# Patient Record
Sex: FEMALE | Race: WHITE | Employment: OTHER | URBAN - METROPOLITAN AREA
[De-identification: names, ages, dates, MRNs, and addresses within clinical notes are randomized per-mention and may not be internally consistent; named-entity substitution may affect disease eponyms.]

---

## 2022-07-20 RX ORDER — ATORVASTATIN CALCIUM 10 MG/1
10 TABLET, FILM COATED ORAL DAILY
COMMUNITY

## 2022-07-20 RX ORDER — PANTOPRAZOLE SODIUM 40 MG/1
40 GRANULE, DELAYED RELEASE ORAL
COMMUNITY

## 2022-07-20 RX ORDER — NITROFURANTOIN 25; 75 MG/1; MG/1
100 CAPSULE ORAL EVERY 12 HOURS
COMMUNITY

## 2022-07-20 NOTE — PROGRESS NOTES
C-diff Questionnaire:     * Admitted with diarrhea? [x] YES    []  NO     *Prior history of C-Diff. In last 3 months? [x] YES    []  NO     *Antibiotic use in the past 6-8 weeks? []  NO    [x]  YES      If yes, which: REASON___UTI______________     *Prior hospitalization or nursing home in the last month? []  YES    [x]  NO     [facility]. .call before you fall    [address] time__0830__________        Surgery time_____1000_______    Do not eat or drink anything after 12:00 midnight prior to your surgery. This includes water chewing gum, mints and ice chips- the Day of Surgery. Follow your MD/Surgeons pre-procedure instructions regarding your medications   You may brush your teeth and gargle the morning of your surgery, but do not swallow the water     Please see your family doctor/pediatrician for a history and physical and/or questions concerning medications. Bring any test results/reports from your physicians office. If you are under the care of a heart doctor or specialist doctor, please be aware that you may be asked to them for clearance    You may be asked to stop blood thinners such as Coumadin, Plavix, Fragmin, Lovenox, etc., or any anti-inflammatories such as:  Aspirin, Ibuprofen, Advil, Naproxen prior to your surgery. We also ask that you stop any OTC medications such as fish oil, vitamin E, glucosamine, garlic, Multivitamins, COQ 10, etc.  MAY TAKE TYLENOL  We ask that you do not smoke 24 hours prior to surgery  We ask that you do not  drink any alcoholic beverages 24 hours prior to surgery     You must make arrangements for a responsible adult to take you home after your surgery. For your safety you will not be allowed to leave alone or drive yourself home. Your surgery will be cancelled if you do not have a ride home.      Also for your safety, it is strongly suggested that someone stay with you the first 24 hours after your surgery. A parent or legal guardian must accompany a child scheduled for surgery and plan to stay at the hospital until the child is discharged. Please do not bring other children with you. For your comfort, please wear simple loose fitting clothing to the hospital.  Please do not bring valuables. Do not wear any make-up or nail polish on your fingers or toes. For your safety, please do not wear any jewelry or body piercing's on the day of surgery. All jewelry must be removed. If you have dentures, they will be removed before going to operating room. For your convenience, we will provide you with a container. If you wear contact lenses or glasses, they will be removed, please bring a case for them. If you have a living will and a durable power of  for healthcare, please bring in a copy. As part of our patient safety program to minimize surgical site infections, we ask you to do the following:    Please notify your surgeon if you develop any illness between         now and the day of your surgery. This includes a cough, cold, fever, sore throat, nausea,         or vomiting, and diarrhea, etc.   Please notify your surgeon if you experience dizziness, shortness         of breath or blurred vision between now and the time of your surgery. Do not shave your operative site 96 hours prior to surgery. For face and neck surgery, men may use an electric razor 48 hours   prior to surgery. You may shower the night before surgery or the morning of   your surgery with an antibacterial soap. You will need to bring a photo ID and insurance card     If you use a C-pap or Bi-pap machine, please bring your machine with you to the hospital     Our goal is to provide you with excellent care, therefore, visitors will be limited to so that we may focus on providing this care for you. Please contact your surgeon office, if you have any further questions. [facility] phone number:  [phone] PAT fax number:  [phone]    Please note these are generalized instructions for all surgical cases, you may be provided with more specific instructions according to your surgery.

## 2022-07-28 ENCOUNTER — ANESTHESIA EVENT (OUTPATIENT)
Dept: ENDOSCOPY | Age: 77
End: 2022-07-28
Payer: MEDICARE

## 2022-07-29 ENCOUNTER — HOSPITAL ENCOUNTER (OUTPATIENT)
Age: 77
Setting detail: OUTPATIENT SURGERY
Discharge: HOME | End: 2022-07-29
Attending: INTERNAL MEDICINE | Admitting: INTERNAL MEDICINE
Payer: MEDICARE

## 2022-07-29 ENCOUNTER — ANESTHESIA (OUTPATIENT)
Dept: ENDOSCOPY | Age: 77
End: 2022-07-29
Payer: MEDICARE

## 2022-07-29 VITALS
TEMPERATURE: 97.6 F | HEART RATE: 79 BPM | WEIGHT: 144.29 LBS | RESPIRATION RATE: 18 BRPM | DIASTOLIC BLOOD PRESSURE: 69 MMHG | SYSTOLIC BLOOD PRESSURE: 132 MMHG | OXYGEN SATURATION: 97 % | BODY MASS INDEX: 27.24 KG/M2 | HEIGHT: 61 IN

## 2022-07-29 DIAGNOSIS — Z86.010 PERSONAL HISTORY OF COLONIC POLYPS: ICD-10-CM

## 2022-07-29 PROCEDURE — 2580000003 HC RX 258: Performed by: ANESTHESIOLOGY

## 2022-07-29 PROCEDURE — 6360000002 HC RX W HCPCS: Performed by: NURSE ANESTHETIST, CERTIFIED REGISTERED

## 2022-07-29 PROCEDURE — 2580000003 HC RX 258: Performed by: NURSE ANESTHETIST, CERTIFIED REGISTERED

## 2022-07-29 PROCEDURE — 7100000001 HC PACU RECOVERY - ADDTL 15 MIN: Performed by: INTERNAL MEDICINE

## 2022-07-29 PROCEDURE — 7100000011 HC PHASE II RECOVERY - ADDTL 15 MIN: Performed by: INTERNAL MEDICINE

## 2022-07-29 PROCEDURE — 3700000001 HC ADD 15 MINUTES (ANESTHESIA): Performed by: INTERNAL MEDICINE

## 2022-07-29 PROCEDURE — 3609010600 HC COLONOSCOPY POLYPECTOMY SNARE/COLD BIOPSY: Performed by: INTERNAL MEDICINE

## 2022-07-29 PROCEDURE — 7100000000 HC PACU RECOVERY - FIRST 15 MIN: Performed by: INTERNAL MEDICINE

## 2022-07-29 PROCEDURE — 88305 TISSUE EXAM BY PATHOLOGIST: CPT

## 2022-07-29 PROCEDURE — 2709999900 HC NON-CHARGEABLE SUPPLY: Performed by: INTERNAL MEDICINE

## 2022-07-29 PROCEDURE — 3700000000 HC ANESTHESIA ATTENDED CARE: Performed by: INTERNAL MEDICINE

## 2022-07-29 PROCEDURE — 7100000010 HC PHASE II RECOVERY - FIRST 15 MIN: Performed by: INTERNAL MEDICINE

## 2022-07-29 RX ORDER — SODIUM CHLORIDE 0.9 % (FLUSH) 0.9 %
5-40 SYRINGE (ML) INJECTION PRN
Status: DISCONTINUED | OUTPATIENT
Start: 2022-07-29 | End: 2022-07-29 | Stop reason: HOSPADM

## 2022-07-29 RX ORDER — ACETAMINOPHEN 325 MG/1
975 TABLET ORAL
Status: DISCONTINUED | OUTPATIENT
Start: 2022-07-29 | End: 2022-07-29 | Stop reason: HOSPADM

## 2022-07-29 RX ORDER — FENTANYL CITRATE 50 UG/ML
25 INJECTION, SOLUTION INTRAMUSCULAR; INTRAVENOUS EVERY 5 MIN PRN
Status: DISCONTINUED | OUTPATIENT
Start: 2022-07-29 | End: 2022-07-29 | Stop reason: HOSPADM

## 2022-07-29 RX ORDER — PROPOFOL 10 MG/ML
INJECTION, EMULSION INTRAVENOUS PRN
Status: DISCONTINUED | OUTPATIENT
Start: 2022-07-29 | End: 2022-07-29 | Stop reason: SDUPTHER

## 2022-07-29 RX ORDER — SODIUM CHLORIDE 9 MG/ML
INJECTION, SOLUTION INTRAVENOUS PRN
Status: DISCONTINUED | OUTPATIENT
Start: 2022-07-29 | End: 2022-07-29 | Stop reason: HOSPADM

## 2022-07-29 RX ORDER — HYDRALAZINE HYDROCHLORIDE 20 MG/ML
10 INJECTION INTRAMUSCULAR; INTRAVENOUS
Status: DISCONTINUED | OUTPATIENT
Start: 2022-07-29 | End: 2022-07-29 | Stop reason: HOSPADM

## 2022-07-29 RX ORDER — PROPOFOL 10 MG/ML
INJECTION, EMULSION INTRAVENOUS CONTINUOUS PRN
Status: DISCONTINUED | OUTPATIENT
Start: 2022-07-29 | End: 2022-07-29 | Stop reason: SDUPTHER

## 2022-07-29 RX ORDER — SODIUM CHLORIDE 0.9 % (FLUSH) 0.9 %
5-40 SYRINGE (ML) INJECTION EVERY 12 HOURS SCHEDULED
Status: DISCONTINUED | OUTPATIENT
Start: 2022-07-29 | End: 2022-07-29 | Stop reason: HOSPADM

## 2022-07-29 RX ORDER — ONDANSETRON 2 MG/ML
4 INJECTION INTRAMUSCULAR; INTRAVENOUS
Status: DISCONTINUED | OUTPATIENT
Start: 2022-07-29 | End: 2022-07-29 | Stop reason: HOSPADM

## 2022-07-29 RX ORDER — SODIUM CHLORIDE 9 MG/ML
INJECTION, SOLUTION INTRAVENOUS CONTINUOUS
Status: DISCONTINUED | OUTPATIENT
Start: 2022-07-29 | End: 2022-07-29 | Stop reason: HOSPADM

## 2022-07-29 RX ORDER — IPRATROPIUM BROMIDE AND ALBUTEROL SULFATE 2.5; .5 MG/3ML; MG/3ML
1 SOLUTION RESPIRATORY (INHALATION)
Status: DISCONTINUED | OUTPATIENT
Start: 2022-07-29 | End: 2022-07-29 | Stop reason: HOSPADM

## 2022-07-29 RX ORDER — LABETALOL HYDROCHLORIDE 5 MG/ML
10 INJECTION, SOLUTION INTRAVENOUS
Status: DISCONTINUED | OUTPATIENT
Start: 2022-07-29 | End: 2022-07-29 | Stop reason: HOSPADM

## 2022-07-29 RX ORDER — SODIUM CHLORIDE 9 MG/ML
INJECTION, SOLUTION INTRAVENOUS CONTINUOUS PRN
Status: DISCONTINUED | OUTPATIENT
Start: 2022-07-29 | End: 2022-07-29 | Stop reason: SDUPTHER

## 2022-07-29 RX ORDER — FENTANYL CITRATE 50 UG/ML
50 INJECTION, SOLUTION INTRAMUSCULAR; INTRAVENOUS EVERY 5 MIN PRN
Status: DISCONTINUED | OUTPATIENT
Start: 2022-07-29 | End: 2022-07-29 | Stop reason: HOSPADM

## 2022-07-29 RX ADMIN — PROPOFOL 70 MG: 10 INJECTION, EMULSION INTRAVENOUS at 09:53

## 2022-07-29 RX ADMIN — PROPOFOL 150 MCG/KG/MIN: 10 INJECTION, EMULSION INTRAVENOUS at 09:53

## 2022-07-29 RX ADMIN — SODIUM CHLORIDE: 9 INJECTION, SOLUTION INTRAVENOUS at 09:06

## 2022-07-29 RX ADMIN — SODIUM CHLORIDE: 9 INJECTION, SOLUTION INTRAVENOUS at 09:26

## 2022-07-29 ASSESSMENT — PAIN SCALES - GENERAL
PAINLEVEL_OUTOF10: 0

## 2022-07-29 ASSESSMENT — LIFESTYLE VARIABLES: SMOKING_STATUS: 0

## 2022-07-29 NOTE — ANESTHESIA POSTPROCEDURE EVALUATION
Department of Anesthesiology  Postprocedure Note    Patient: Mainor Miller  MRN: [id number]  YOB: 1945  Date of evaluation: 7/29/2022      Procedure Summary     Date: 07/29/22 Room / Location: [address] [place] [address]    Anesthesia Start: 3785 Anesthesia Stop: 9242    Procedure: COLONOSCOPY POLYPECTOMY SNARE/COLD BIOPSY Diagnosis:       Personal history of colonic polyps      (PERSONAL HISTORY OF COLONIC POLYPS)    Surgeons: Britney Vera MD Responsible Provider: Julissa Calixto MD    Anesthesia Type: MAC ASA Status: 2          Anesthesia Type: MAC    Cynthia Phase I: Cynthia Score: 10    Cynthia Phase II: Cynthia Score: 10      Anesthesia Post Evaluation    Patient location during evaluation: PACU  Patient participation: complete - patient participated  Level of consciousness: awake and alert  Airway patency: patent  Nausea & Vomiting: no nausea and no vomiting  Complications: no  Cardiovascular status: hemodynamically stable and blood pressure returned to baseline  Respiratory status: spontaneous ventilation, nonlabored ventilation and room air  Hydration status: stable  Comments: Ms. Darline Garcia was seen resting comfortably following procedure. Anticipate return [facility] for planned discharge home with .

## 2022-07-29 NOTE — OP NOTE
Colonoscopy Note    Patient:   Chloe Ramesh    :    1945    Facility:   [facility] [Outpatient]  Referring/PCP: Tiera Solis MD  Procedure:   Colonoscopy   Date:     2022  Endoscopist:  Sandi Zhang MD, MD    Preoperative Diagnosis:  family history of colon cancer. Postoperative Diagnosis: 2 ascending colon polyps removed with a cold snare. Severe diverticular disease in the sigmoid colon. Anesthesia: MAC    Estimated blood loss: Minimal    Complications:  None    Description of Procedure:  Informed consent was obtained from the patient after explanation of the procedure including indications, description of the procedure,  benefits and possible risks and complications of the procedure, and alternatives. Questions were answered. The patient's history was reviewed and a directed physical examination was performed prior to the procedure. Patient was monitored throughout the procedure with pulse oximetry and periodic assessment of vital signs. Patient was sedated as noted above. With the patient initially in the left lateral decubitus position, a digital rectal examination was performed and revealed negative without mass, lesions or tenderness. The Olympus video colonoscope was placed in the patient's rectum and advanced without difficulty  to the cecum, which was identified by the ileocecal valve and appendiceal orifice. The prep was excellent. Examination of the mucosa was performed during both introduction and withdrawal of the colonoscope. Retroflexed view of the rectum was performed. Findings: The mucosa throughout the colon is normal.  There was no evidence of colitis. 2 diminutive ascending colon polyps were removed with a cold snare. Specimens were recovered and sent for pathology. The patient has severe diverticular disease in the sigmoid colon. Small internal hemorrhoids were noted on retroflexed view.       Recommendations: Call in 1 week for pathology findings. High-fiber diet. Follow-up as needed.     Nat Brittle, MD, MD

## 2022-07-29 NOTE — ANESTHESIA PRE PROCEDURE
Department of Anesthesiology  Preprocedure Note       Name:  Isis Downing   Age:  68 y.o.  :  1945                                          MRN:  [id number]         Date:  2022      Surgeon: Linette Rome):  Isaac Bojorquez MD    Procedure: Procedure(s):  COLONOSCOPY    Medications prior to admission:   Prior to Admission medications    Medication Sig Start Date End Date Taking? Authorizing Provider   nitrofurantoin, macrocrystal-monohydrate, (MACROBID) 100 MG capsule Take 100 mg by mouth in the morning and 100 mg in the evening. Yes Historical Provider, MD   pantoprazole sodium (PROTONIX) 40 MG PACK packet Take 40 mg by mouth every morning (before breakfast)   Yes Historical Provider, MD   atorvastatin (LIPITOR) 10 MG tablet Take 10 mg by mouth in the morning. TAKES 1/2 TAB. Yes Historical Provider, MD   NONFORMULARY Take 2 tablets by mouth daily PARDARCO-SUPPLEMENT   Yes Historical Provider, MD       Current medications:    Current Facility-Administered Medications   Medication Dose Route Frequency Provider Last Rate Last Admin    0.9 % sodium chloride infusion   IntraVENous Continuous Kenton Litten, MD        sodium chloride flush 0.9 % injection 5-40 mL  5-40 mL IntraVENous 2 times per day Kenton Litten, MD        sodium chloride flush 0.9 % injection 5-40 mL  5-40 mL IntraVENous PRN Kenton Litten, MD        0.9 % sodium chloride infusion   IntraVENous PRN Kenton Litten, MD           Allergies:     Allergies   Allergen Reactions    Clindamycin/Lincomycin Diarrhea    Pcn [Penicillins] Other (See Comments)     BLISTERS ON MOUTH       Problem List:    Patient Active Problem List   Diagnosis Code    Puncture wound of hand S61.439A       Past Medical History:        Diagnosis Date    Acid reflux     Arthritis     [facility] [address]     Hyperlipidemia     BORDERLINE    Vertigo        Past Surgical History:        Procedure Laterality Date    APPENDECTOMY      BREAST LUMPECTOMY Right     COLONOSCOPY      ENDOSCOPY, COLON, DIAGNOSTIC         Social History:    Social History     Tobacco Use    Smoking status: Former     Years: 3.00     Types: Cigarettes     Quit date:      Years since quittin.6    Smokeless tobacco: Never   Substance Use Topics    Alcohol use: Yes     Comment: SOCIALLY                                Counseling given: Not Answered      Vital Signs (Current):   Vitals:    22 1645 22 0845   Weight: 140 lb (63.5 kg) 144 lb 4.7 oz (65.5 kg)   Height: 5' 1\" (1.549 m) 5' 1\" (1.549 m)                                              BP Readings from Last 3 Encounters:   13 112/48       NPO Status: Time of last liquid consumption: 0300                        Time of last solid consumption: 1200                        Date of last liquid consumption: 22                        Date of last solid food consumption: 22    BMI:   Wt Readings from Last 3 Encounters:   22 144 lb 4.7 oz (65.5 kg)   13 128 lb (58.1 kg)     Body mass index is 27.26 kg/m². CBC: No results found for: WBC, RBC, HGB, HCT, MCV, RDW, PLT    CMP: No results found for: NA, K, CL, CO2, BUN, CREATININE, GFRAA, AGRATIO, LABGLOM, GLUCOSE, GLU, PROT, CALCIUM, BILITOT, ALKPHOS, AST, ALT    POC Tests: No results for input(s): POCGLU, POCNA, POCK, POCCL, POCBUN, POCHEMO, POCHCT in the last 72 hours.     Coags: No results found for: PROTIME, INR, APTT    HCG (If Applicable): No results found for: PREGTESTUR, PREGSERUM, HCG, HCGQUANT     ABGs: No results found for: PHART, PO2ART, TFG3ELO, QDR0HLF, BEART, J1ASIBGJ     Type & Screen (If Applicable):  No results found for: LABABO, LABRH    Drug/Infectious Status (If Applicable):  No results found for: HIV, HEPCAB    COVID-19 Screening (If Applicable): No results found for: COVID19        Anesthesia Evaluation   no history of anesthetic complications:   Airway: Mallampati: II  TM distance: >3 FB   Neck ROM: limited  Mouth opening: > = 3 FB   Dental: normal exam         Pulmonary:       (-) COPD, asthma, rhonchi, wheezes, rales and not a current smoker                           Cardiovascular:  Exercise tolerance: good (>4 METS),   (+) hyperlipidemia    (-) hypertension, orthopnea, weak pulses and peripheral edema      Rhythm: regular  Rate: normal                 ROS comment: MUGA (2012): Normal left ventricular wall motion and contractility with LVEF of 62.3%     Neuro/Psych:      (-) seizures, TIA and CVA           GI/Hepatic/Renal:   (+) GERD:,      (-) liver disease, no renal disease and no morbid obesity      ROS comment: h/o esophageal stricture s/p dilation. Endo/Other:    (+) : arthritis:., malignancy/cancer. (-) diabetes mellitus (HgbA1c: 5.3%)               Abdominal:         (-) obese Abdomen: soft. Vascular: Other Findings:           Anesthesia Plan      MAC     ASA 2     (NPO appropriate. Ms. Luis Flowers denies active nausea / reflux. Patient reports anal discomfort, suspects new onset of hemorrhoids with prep.)  Induction: intravenous. Anesthetic plan and risks discussed with patient. Plan discussed with CRNA. This pre-anesthesia assessment may be used as a history and physical.    DOS STAFF ADDENDUM:    Pt seen and examined, chart reviewed (including anesthesia, drug and allergy history). No interval changes to history and physical examination. Anesthetic plan, risks, benefits, alternatives, and personnel involved discussed with patient. Patient verbalized an understanding and agrees to proceed.       Americo Butts MD  July 29, 2022  8:59 AM

## 2022-07-29 NOTE — ADDENDUM NOTE
Addendum  created 07/29/22 1213 by Arash Chakraborty MD    Intraprocedure Event edited, Intraprocedure Staff edited

## 2022-07-29 NOTE — PROGRESS NOTES
Patient arrived to PACU at this time. Monitors applied. Bed locked and side rails up. Will continue to closely monitor patient.

## 2022-07-29 NOTE — PROGRESS NOTES
Pt sitting up in bed, tolerating PO snack. Pt denies pain states ready to leave. IV removed, discharge discussed with family.

## 2022-07-29 NOTE — H&P
Gastroenteroloy   Attending Pre-operative History and Physical    INDICATION: Screening colonoscopy, family history of colon cancer. PROCEDURE: Colonoscopy    History Obtained From:  patient    HISTORY OF PRESENT ILLNESS:    The patient is a 68 y.o. female presents for a screening colonoscopy. Past Medical History:    Past Medical History:   Diagnosis Date    Acid reflux     Arthritis     Cancer ([facility] [address]     Hyperlipidemia     BORDERLINE    Vertigo       Past Surgical History:    Past Surgical History:   Procedure Laterality Date    APPENDECTOMY      BREAST LUMPECTOMY Right     COLONOSCOPY      ENDOSCOPY, COLON, DIAGNOSTIC        Medications Prior to Admission:   Prior to Admission medications    Medication Sig Start Date End Date Taking? Authorizing Provider   nitrofurantoin, macrocrystal-monohydrate, (MACROBID) 100 MG capsule Take 100 mg by mouth in the morning and 100 mg in the evening. Yes Historical Provider, MD   pantoprazole sodium (PROTONIX) 40 MG PACK packet Take 40 mg by mouth every morning (before breakfast)   Yes Historical Provider, MD   atorvastatin (LIPITOR) 10 MG tablet Take 10 mg by mouth in the morning. TAKES 1/2 TAB. Yes Historical Provider, MD   NONFORMULARY Take 2 tablets by mouth daily PARDARCO-SUPPLEMENT   Yes Historical Provider, MD        Allergies:  Clindamycin/lincomycin and Pcn [penicillins]  History of allergic reaction to anesthesia:  No    Social History:   Social History     Socioeconomic History    Marital status:      Spouse name: Not on file    Number of children: Not on file    Years of education: Not on file    Highest education level: Not on file   Occupational History    Not on file   Tobacco Use    Smoking status: Former     Years: 3.00     Types: Cigarettes     Quit date: 65     Years since quittin.6    Smokeless tobacco: Never   Vaping Use    Vaping Use: Never used   Substance and Sexual Activity    Alcohol use: Yes     Comment: SOCIALLY    Drug use:  No

## 2022-07-29 NOTE — PROGRESS NOTES
Pt to phase 2 from pacu. Pt a/o x4, states no pain in abdo at this time, pt sitting in bed, given PO snack.  called to bedside.

## 2022-07-29 NOTE — DISCHARGE INSTRUCTIONS

## 2023-01-19 ENCOUNTER — HOSPITAL ENCOUNTER (OUTPATIENT)
Dept: WOMENS IMAGING | Age: 78
Discharge: HOME | End: 2023-01-19
Payer: MEDICARE

## 2023-01-19 DIAGNOSIS — Z12.31 VISIT FOR SCREENING MAMMOGRAM: ICD-10-CM

## 2023-01-19 PROCEDURE — 77063 BREAST TOMOSYNTHESIS BI: CPT

## 2023-07-05 ENCOUNTER — OFFICE VISIT (OUTPATIENT)
Dept: UROGYNECOLOGY | Age: 78
End: 2023-07-05
Payer: MEDICARE

## 2023-07-05 VITALS
SYSTOLIC BLOOD PRESSURE: 137 MMHG | RESPIRATION RATE: 18 BRPM | HEART RATE: 76 BPM | OXYGEN SATURATION: 97 % | DIASTOLIC BLOOD PRESSURE: 73 MMHG | TEMPERATURE: 98.2 F

## 2023-07-05 DIAGNOSIS — N81.11 CYSTOCELE, MIDLINE: Primary | ICD-10-CM

## 2023-07-05 DIAGNOSIS — N81.4 UTERINE PROLAPSE: ICD-10-CM

## 2023-07-05 DIAGNOSIS — R35.0 URINARY FREQUENCY: ICD-10-CM

## 2023-07-05 DIAGNOSIS — N95.2 VAGINAL ATROPHY: ICD-10-CM

## 2023-07-05 LAB
BILIRUBIN, POC: NORMAL
BLOOD URINE, POC: NORMAL
CLARITY, POC: CLEAR
COLOR, POC: YELLOW
EMPTY COUGH STRESS TEST: NORMAL
FIRST SENSATION: 60 CC
FULL COUGH STRESS TEST: NORMAL
GLUCOSE URINE, POC: NORMAL
KETONES, POC: NORMAL
LEUKOCYTE EST, POC: NORMAL
MAX SENSATION: 250 CC
NITRATE, URINE POC: NORMAL
NITRITE, POC: NORMAL
PH, POC: 6.5
POST VOID RESIDUAL (PVR): 125 ML
PROTEIN, POC: NORMAL
RBC URINE, POC: NORMAL
SECOND SENSATION: 130 CC
SPASM: NORMAL
SPECIFIC GRAVITY, POC: 1.01
UROBILINOGEN, POC: NORMAL
WBC URINE, POC: NORMAL

## 2023-07-05 PROCEDURE — 81002 URINALYSIS NONAUTO W/O SCOPE: CPT | Performed by: OBSTETRICS & GYNECOLOGY

## 2023-07-05 PROCEDURE — 51725 SIMPLE CYSTOMETROGRAM: CPT | Performed by: OBSTETRICS & GYNECOLOGY

## 2023-07-05 PROCEDURE — 99204 OFFICE O/P NEW MOD 45 MIN: CPT | Performed by: OBSTETRICS & GYNECOLOGY

## 2023-07-05 PROCEDURE — A4561 PESSARY RUBBER, ANY TYPE: HCPCS | Performed by: OBSTETRICS & GYNECOLOGY

## 2023-07-05 NOTE — PROGRESS NOTES
07/05/2023 neg  Final     SPASM   Date Value Ref Range Status   07/05/2023 neg  Final        POPQ and Pelvic Exam:     Pelvic Organ Prolapse Quantification  Anterior Wall (Aa): +3   Anterior Wall (Ba): +3   Cervix or Cuff (C): 0     Genital Hiatus (gh): 5   Perineal Body (pb): 3   Total Vaginal Length (tvl): 8     Posterior Wall (Ap): -2   Posterior Wall (Bp): -2   Posterior Fornix (D): -6     No data recorded     Assessment/Plan:     Antoinette Hamman is a 66 y.o. female with   1. Cystocele, midline    2. Uterine prolapse    3. Vaginal atrophy    4. Urinary frequency    Old records reviewed, outside records reviewed, cystometrogram was reviewed    Gene Escudero presents today for consultation for prolapse and urinary urgency and frequency from Dr. Ralph Petersen. On examination today she does have a cystocele and uterine prolapse. I talked to her about surgical and nonsurgical options and she would like to try a pessary. #2 ring pessary with support was placed without difficulty, I did draw a picture of her prolapse and also gave her handouts. She will follow-up in approximately 1 week to see how well this is working. Orders Placed This Encounter   Procedures    POCT Urinalysis no Micro    VA PESSARY RUBBER, ANY TYPE    Cystometrogram     No orders of the defined types were placed in this encounter.       Mellisa Daniel MD

## 2023-07-18 ENCOUNTER — OFFICE VISIT (OUTPATIENT)
Dept: UROGYNECOLOGY | Age: 78
End: 2023-07-18
Payer: MEDICARE

## 2023-07-18 VITALS
OXYGEN SATURATION: 97 % | DIASTOLIC BLOOD PRESSURE: 61 MMHG | HEART RATE: 82 BPM | TEMPERATURE: 98.1 F | RESPIRATION RATE: 16 BRPM | SYSTOLIC BLOOD PRESSURE: 116 MMHG

## 2023-07-18 DIAGNOSIS — N81.11 CYSTOCELE, MIDLINE: Primary | ICD-10-CM

## 2023-07-18 DIAGNOSIS — N89.8 VAGINAL DISCHARGE: ICD-10-CM

## 2023-07-18 DIAGNOSIS — N81.4 UTERINE PROLAPSE: ICD-10-CM

## 2023-07-18 PROCEDURE — 57150 TREAT VAGINA INFECTION: CPT | Performed by: NURSE PRACTITIONER

## 2023-07-18 PROCEDURE — 1123F ACP DISCUSS/DSCN MKR DOCD: CPT | Performed by: NURSE PRACTITIONER

## 2023-07-18 PROCEDURE — 99213 OFFICE O/P EST LOW 20 MIN: CPT | Performed by: NURSE PRACTITIONER

## 2023-07-18 NOTE — PROGRESS NOTES
Date: 2023     HPI:     Name: Anjali Haynes  YOB: 1945    CC: Anjali Haynes returns for a pessary check today. HPI: She reports no bleeding, has discharge, no pain, no discomfot. She is  removing the device herself at home. Do you have vaginal discharge?: Yes  How long have you had this problem?:   weeks  Please rate the severity of your problem: moderate  Anything make it better? Pessary seems to help     Ob/Gyn History:    OB History    Para Term  AB Living   2 2 2     2   SAB IAB Ectopic Molar Multiple Live Births             2      # Outcome Date GA Lbr Parveen/2nd Weight Sex Delivery Anes PTL Lv   2 Term         CRISTIAN   1 Term         CRISTIAN      Past Medical History:   Past Medical History:   Diagnosis Date    Acid reflux     Arthritis     Cancer ([facility])     Hyperlipidemia     BORDERLINE    Vertigo      Past Surgical History:   Past Surgical History:   Procedure Laterality Date    APPENDECTOMY      BREAST LUMPECTOMY Right     COLONOSCOPY      COLONOSCOPY N/A 2022    COLONOSCOPY POLYPECTOMY SNARE/COLD BIOPSY performed by Juan Alberto Iniguez MD at [address] [facility], COLON, DIAGNOSTIC       Current Medications:  Current Outpatient Medications   Medication Sig Dispense Refill    BIOTIN PO Take by mouth      nitrofurantoin, macrocrystal-monohydrate, (MACROBID) 100 MG capsule Take 100 mg by mouth in the morning and 100 mg in the evening. (Patient not taking: Reported on 2023)      pantoprazole sodium (PROTONIX) 40 MG PACK packet Take 40 mg by mouth every morning (before breakfast)      atorvastatin (LIPITOR) 10 MG tablet Take 10 mg by mouth in the morning. TAKES 1/2 TAB. NONFORMULARY Take 2 tablets by mouth daily PARDARCO-SUPPLEMENT (Patient not taking: Reported on 2023)       No current facility-administered medications for this visit. Allergies:    Allergies   Allergen Reactions    Clarithromycin Diarrhea    Clindamycin/Lincomycin Diarrhea    Pcn

## 2023-12-04 NOTE — PERIOP NOTE
[address] [facility]  [phone]        Pre-Op Phone Call:     Patient Name: Jf Moseley     Telephone Information:   Mobile [phone]     Home phone:  [phone]    Surgery Time:   11:50     Arrival Time:  10:40     Left extended Message:  Yes     Message left with: Left Voicemail      Recent change in health status:  NA           Electronically signed by:  Maurice Denson RN at 12/4/2023 11:58 AM

## 2023-12-05 ENCOUNTER — HOSPITAL ENCOUNTER (OUTPATIENT)
Dept: SURGERY | Age: 78
Discharge: HOME | End: 2023-12-05
Payer: MEDICARE

## 2023-12-05 VITALS — SYSTOLIC BLOOD PRESSURE: 135 MMHG | DIASTOLIC BLOOD PRESSURE: 70 MMHG

## 2023-12-05 PROCEDURE — 66821 AFTER CATARACT LASER SURGERY: CPT

## 2023-12-05 PROCEDURE — 6370000000 HC RX 637 (ALT 250 FOR IP): Performed by: STUDENT IN AN ORGANIZED HEALTH CARE EDUCATION/TRAINING PROGRAM

## 2023-12-05 PROCEDURE — 2500000003 HC RX 250 WO HCPCS: Performed by: STUDENT IN AN ORGANIZED HEALTH CARE EDUCATION/TRAINING PROGRAM

## 2023-12-05 RX ORDER — PHENYLEPHRINE HYDROCHLORIDE 25 MG/ML
1 SOLUTION/ DROPS OPHTHALMIC ONCE
Status: COMPLETED | OUTPATIENT
Start: 2023-12-05 | End: 2023-12-05

## 2023-12-05 RX ORDER — PROPARACAINE HYDROCHLORIDE 5 MG/ML
1 SOLUTION/ DROPS OPHTHALMIC ONCE
Status: COMPLETED | OUTPATIENT
Start: 2023-12-05 | End: 2023-12-05

## 2023-12-05 RX ORDER — TROPICAMIDE 10 MG/ML
1 SOLUTION/ DROPS OPHTHALMIC ONCE
Status: COMPLETED | OUTPATIENT
Start: 2023-12-05 | End: 2023-12-05

## 2023-12-05 RX ADMIN — PROPARACAINE HYDROCHLORIDE 1 DROP: 5 SOLUTION/ DROPS OPHTHALMIC at 10:57

## 2023-12-05 RX ADMIN — TROPICAMIDE 1 DROP: 10 SOLUTION/ DROPS OPHTHALMIC at 10:57

## 2023-12-05 RX ADMIN — PHENYLEPHRINE HYDROCHLORIDE 1 DROP: 25 SOLUTION/ DROPS OPHTHALMIC at 10:57

## 2023-12-05 NOTE — H&P
ophthalmology notes    Plan:   1. Risks, benefits, alternatives to surgery discussed with the patient and family. 2. All questions were answered to their satisfaction. 3. Ok to proceed with surgery as planned.     Ryann Jackson MD

## 2023-12-05 NOTE — OP NOTE
Breonna Toth    OPERATIVE NOTE    Preoperative Diagnosis: Posterior Capsular Opacification the right eye    Postoperative Diagnosis: Posterior Capsular Opacification the right eye    Procedure: YAG posterior capsulotomy the right eye    Surgeon: Namrata Bowman MD    Anesthesia: Topical    Complications: none    Specimens: none    Indications for procedure: The patient is a 66y.o. year old who is status post cataract extraction with intraocular lens implantation who complained of increasing glare and blurry vision. Visually significant posterior capsular opacification was noted on examination. Risks, benefits, and alternatives to surgery were discussed with the patient and the patient elected to proceed with YAG laser posterior capsulotomy of the the right eye. Details of the procedure: The patient was brought the laser room. The patient had topical proparacaine drops instilled. The patient was positioned at the YAG laser where 27 shots were administered at 1.1 millijoules in a circular pattern. The patient tolerated the procedure well. The patient will follow up as an outpatient as scheduled.

## 2023-12-05 NOTE — PROGRESS NOTES
Patient: Beena Springer  1945, 78 y.o./female    Ambulatory to laser room. Right eye marked and numbed by Dr. Ryann Marquez . Laser procedure done and tolerated well by patient. Post  instructions given to [facility] by provider.       Power setting was 1.1 mJ        # of shots was 27    Total power was 29.0 mJ    Justice Mann RN

## 2024-01-16 ENCOUNTER — OFFICE VISIT (OUTPATIENT)
Dept: UROGYNECOLOGY | Age: 79
End: 2024-01-16
Payer: MEDICARE

## 2024-01-16 VITALS
TEMPERATURE: 98.2 F | SYSTOLIC BLOOD PRESSURE: 136 MMHG | OXYGEN SATURATION: 99 % | RESPIRATION RATE: 18 BRPM | DIASTOLIC BLOOD PRESSURE: 77 MMHG | HEART RATE: 90 BPM

## 2024-01-16 DIAGNOSIS — N39.0 RECURRENT UTI: Primary | ICD-10-CM

## 2024-01-16 DIAGNOSIS — N89.8 VAGINAL DISCHARGE: ICD-10-CM

## 2024-01-16 DIAGNOSIS — R30.0 DYSURIA: ICD-10-CM

## 2024-01-16 DIAGNOSIS — R35.0 URINARY FREQUENCY: ICD-10-CM

## 2024-01-16 DIAGNOSIS — R39.15 URINARY URGENCY: ICD-10-CM

## 2024-01-16 DIAGNOSIS — N81.4 UTERINE PROLAPSE: ICD-10-CM

## 2024-01-16 DIAGNOSIS — N81.11 CYSTOCELE, MIDLINE: ICD-10-CM

## 2024-01-16 LAB
BILIRUBIN, POC: NORMAL
BLOOD URINE, POC: NORMAL
CLARITY, POC: CLEAR
COLOR, POC: YELLOW
GLUCOSE URINE, POC: NORMAL
KETONES, POC: NORMAL
LEUKOCYTE EST, POC: NORMAL
NITRITE, POC: NORMAL
PH, POC: 6
PROTEIN, POC: NORMAL
SPECIFIC GRAVITY, POC: 1.02
UROBILINOGEN, POC: NORMAL

## 2024-01-16 PROCEDURE — 99214 OFFICE O/P EST MOD 30 MIN: CPT | Performed by: NURSE PRACTITIONER

## 2024-01-16 PROCEDURE — 57150 TREAT VAGINA INFECTION: CPT | Performed by: NURSE PRACTITIONER

## 2024-01-16 PROCEDURE — 81002 URINALYSIS NONAUTO W/O SCOPE: CPT | Performed by: NURSE PRACTITIONER

## 2024-01-16 PROCEDURE — 1123F ACP DISCUSS/DSCN MKR DOCD: CPT | Performed by: NURSE PRACTITIONER

## 2024-01-16 RX ORDER — PHENAZOPYRIDINE HYDROCHLORIDE 100 MG/1
100 TABLET, FILM COATED ORAL 3 TIMES DAILY PRN
Qty: 9 TABLET | Refills: 0 | Status: SHIPPED | OUTPATIENT
Start: 2024-01-16 | End: 2024-01-19

## 2024-01-16 RX ORDER — PREDNISOLONE ACETATE 10 MG/ML
SUSPENSION/ DROPS OPHTHALMIC
COMMUNITY
Start: 2023-12-15

## 2024-01-16 RX ORDER — SULFAMETHOXAZOLE AND TRIMETHOPRIM 800; 160 MG/1; MG/1
1 TABLET ORAL EVERY 12 HOURS
COMMUNITY
Start: 2023-12-27

## 2024-01-16 NOTE — PROGRESS NOTES
Date: 1/16/2024     HPI:     Name: Denise Murillo  YOB: 1945    CC: Denise Murillo returns for a pessary check and possible UTI.     HPI: She reports no bleeding, has discharge, no pain, no discomfot. She is  removing the device herself at home.   Do you have vaginal discharge?: Yes  How long have you had this problem?:     Please rate the severity of your problem: moderate  Anything make it better? Ibuprofen and cranberry supplements has helped with her UTI symptoms     Patient states she has UTI symptoms today. She complains of urinary urgency, frequency, and intermittent dysuria x 7 days.     She has had some visits to Urgent Care, [facility] since our last visit with 2 negative cultures.  Most recent culture was however positive    Patient's most recent positive culture from 12/3/2023- treated with Bactrim.     Positive Growth Abnormal  GOOD [facility]   CULTURE RESULTS METHICILLIN RESISTANT STAPHYLOCOCCUS AUREUS   Susceptibility    Organism Antibiotic Method Susceptibility   Methicillin resistant staphylococcus aureus Ceftriaxone KAIT <=4: Resistant    Comment: RESISTANT   Methicillin resistant staphylococcus aureus Ciprofloxacin KAIT <=1: Sensitive    Comment: SUSCEPTIBLE   Methicillin resistant staphylococcus aureus Daptomycin KAIT 0.5: Sensitive    Comment: SUSCEPTIBLE   Methicillin resistant staphylococcus aureus Gentamicin KAIT <=1: Sensitive    Comment: SUSCEPTIBLE   Methicillin resistant staphylococcus aureus Linezolid KAIT 2: Sensitive    Comment: SUSCEPTIBLE   Methicillin resistant staphylococcus aureus Nitrofurantoin KAIT <=32: Sensitive    Comment: SUSCEPTIBLE   Methicillin resistant staphylococcus aureus Oxacillin KAIT >2: Resistant    Comment: RESISTANT   Methicillin resistant staphylococcus aureus Rifampin KAIT <=1: Sensitive    Comment: SUSCEPTIBLE   Methicillin resistant staphylococcus aureus Tetracycline KAIT <=2: Sensitive    Comment: SUSCEPTIBLE   Methicillin resistant staphylococcus aureus

## 2024-01-18 DIAGNOSIS — N39.0 ACUTE UTI: Primary | ICD-10-CM

## 2024-01-18 LAB
BACTERIA UR CULT: ABNORMAL
ORGANISM: ABNORMAL

## 2024-01-18 RX ORDER — DOXYCYCLINE HYCLATE 100 MG
100 TABLET ORAL 2 TIMES DAILY
Qty: 14 TABLET | Refills: 0 | Status: SHIPPED | OUTPATIENT
Start: 2024-01-18 | End: 2024-01-25

## 2024-01-18 RX ORDER — ESTRADIOL 0.1 MG/G
CREAM VAGINAL
Qty: 45 G | Refills: 1 | Status: SHIPPED | OUTPATIENT
Start: 2024-01-18

## 2024-01-18 NOTE — RESULT ENCOUNTER NOTE
Spoke with patient over the phone. Informed her she does have a UTI. Doxycyline was sent to her pharmacy for treatment. Patient reports she is able to use topical vaginal estrogen cream as her oncologist said it was okay for her to use. Estrogen cream also sent to patient preferred pharmacy for vaginal atrophy. Patient is thankful and has no other concerns at this time. Electronically signed by Chuy Mccarthy RN on 1/18/2024 at 9:50 AM

## 2024-01-19 DIAGNOSIS — N39.0 ACUTE UTI: Primary | ICD-10-CM

## 2024-01-25 ENCOUNTER — HOSPITAL ENCOUNTER (OUTPATIENT)
Dept: WOMENS IMAGING | Age: 79
Discharge: HOME | End: 2024-01-25
Payer: MEDICARE

## 2024-01-25 DIAGNOSIS — Z12.31 SCREENING MAMMOGRAM FOR BREAST CANCER: ICD-10-CM

## 2024-01-25 PROCEDURE — 77063 BREAST TOMOSYNTHESIS BI: CPT

## 2024-02-06 ENCOUNTER — HOSPITAL ENCOUNTER (OUTPATIENT)
Dept: SURGERY | Age: 79
Setting detail: OUTPATIENT SURGERY
Discharge: HOME | End: 2024-02-06
Payer: MEDICARE

## 2024-02-06 VITALS — SYSTOLIC BLOOD PRESSURE: 138 MMHG | DIASTOLIC BLOOD PRESSURE: 69 MMHG

## 2024-02-06 PROCEDURE — 6370000000 HC RX 637 (ALT 250 FOR IP): Performed by: STUDENT IN AN ORGANIZED HEALTH CARE EDUCATION/TRAINING PROGRAM

## 2024-02-06 PROCEDURE — 2500000003 HC RX 250 WO HCPCS: Performed by: STUDENT IN AN ORGANIZED HEALTH CARE EDUCATION/TRAINING PROGRAM

## 2024-02-06 PROCEDURE — 66821 AFTER CATARACT LASER SURGERY: CPT

## 2024-02-06 RX ORDER — PROPARACAINE HYDROCHLORIDE 5 MG/ML
1 SOLUTION/ DROPS OPHTHALMIC ONCE
Status: COMPLETED | OUTPATIENT
Start: 2024-02-06 | End: 2024-02-06

## 2024-02-06 RX ORDER — PHENYLEPHRINE HYDROCHLORIDE 25 MG/ML
1 SOLUTION/ DROPS OPHTHALMIC ONCE
Status: COMPLETED | OUTPATIENT
Start: 2024-02-06 | End: 2024-02-06

## 2024-02-06 RX ORDER — TROPICAMIDE 10 MG/ML
1 SOLUTION/ DROPS OPHTHALMIC ONCE
Status: COMPLETED | OUTPATIENT
Start: 2024-02-06 | End: 2024-02-06

## 2024-02-06 RX ADMIN — TROPICAMIDE 1 DROP: 10 SOLUTION/ DROPS OPHTHALMIC at 10:18

## 2024-02-06 RX ADMIN — PHENYLEPHRINE HYDROCHLORIDE 1 DROP: 25 SOLUTION/ DROPS OPHTHALMIC at 10:18

## 2024-02-06 RX ADMIN — PROPARACAINE HYDROCHLORIDE 1 DROP: 5 SOLUTION/ DROPS OPHTHALMIC at 10:18

## 2024-02-06 ASSESSMENT — PAIN - FUNCTIONAL ASSESSMENT: PAIN_FUNCTIONAL_ASSESSMENT: 0-10

## 2024-02-06 NOTE — OP NOTE
Denise Murillo    OPERATIVE NOTE    Preoperative Diagnosis: Posterior Capsular Opacification the left eye    Postoperative Diagnosis: Posterior Capsular Opacification the left eye    Procedure: YAG posterior capsulotomy the left eye    Surgeon: Keysha Jenkins MD    Anesthesia: Topical    Complications: none    Specimens: none    Indications for procedure:  The patient is a 79 y.o. year old who is status post cataract extraction with intraocular lens implantation who complained of increasing glare and blurry vision.  Visually significant posterior capsular opacification was noted on examination.  Risks, benefits, and alternatives to surgery were discussed with the patient and the patient elected to proceed with YAG laser posterior capsulotomy of the the left eye.    Details of the procedure:  The patient was brought the laser room.  The patient had topical proparacaine drops instilled.  The patient was positioned at the YAG laser where 37 shots were administered at 1.8 millijoules in a circular pattern.  The patient tolerated the procedure well.  The patient will follow up as an outpatient as scheduled.

## 2024-02-06 NOTE — H&P
1/2 TAB    Vivian Rodriguez MD   NONFORMULARY Take 2 tablets by mouth daily PARDARCO-SUPPLEMENT  Patient not taking: Reported on 7/5/2023    Vivian Rodriguez MD     Scheduled Meds:  Continuous Infusions:  PRN Meds:.    Allergies   Allergen Reactions    Clarithromycin Diarrhea    Clindamycin/Lincomycin Diarrhea    Pcn [Penicillins] Other (See Comments)     BLISTERS ON MOUTH       Vitals:    02/06/24 1140   BP: 138/69       PHYSICAL EXAMINATION    Gen: NAD  HEENT: atruamatic;  see outpatient ophthalmology record  Pulm: unlabored respirations  Heart: Well perfused  Ext: WWP, no C/C/E  Neuro: no focal defecits    Assessment:   1. VS PCO OS  2. See preoperative ophthalmology notes    Plan:   1. Risks, benefits, alternatives to surgery discussed with the patient and family.  2. All questions were answered to their satisfaction.  3. Ok to proceed as planned.    Keysha Jenkins MD

## 2024-02-06 NOTE — PROGRESS NOTES
Patient: Denise Murillo  1945, 79 y.o./female    Ambulatory to laser room.  Left eye marked and numbed by Dr. Jenkins .  Laser procedure done and tolerated well by patient.  Post  instructions given to Denise by provider.      Power setting was 0.8m        # of shots was 37    Total power was 68.5mJ mJ    Maribell Dumont RN

## 2024-02-06 NOTE — DISCHARGE INSTRUCTIONS
[facility] [place]   [address], [place] [address]   [phone]         Post-Operative Instructions for Laser Surgery          Patient Name: Denise Murillo  : 1945  MRN: [id number]    Tylenol is usually sufficient for any discomfort that you may feel.    It is not uncommon for you to experience the sensation of a large “floater” in your vision. If you experience hundreds of thousands of floaters that do not stop, flashing or arcing lights that do not stop, or a curtain or veil of blackness that you cannot see through, that does not go away, please call the office at [phone]

## 2024-03-05 ENCOUNTER — TELEPHONE (OUTPATIENT)
Dept: UROGYNECOLOGY | Age: 79
End: 2024-03-05

## 2024-03-05 NOTE — TELEPHONE ENCOUNTER
pt has medication to help manage UTI symptoms and wants to know if she can still take it? She has not taken anything as of yet.

## 2024-03-05 NOTE — TELEPHONE ENCOUNTER
Spoke with patient over the phone. She inquired about if she is able to take Azo/Pyridium to manage her UTI discomfort until her visit with us tomorrow (3/6). Informed patient she is able to take this in the meantime. Encouraged her to take this medication no more than the daily dose allows (up to TID PRN).     She verbalized understanding to all of the above. Denies any further questions or concerns at this time.

## 2024-03-06 ENCOUNTER — OFFICE VISIT (OUTPATIENT)
Dept: UROGYNECOLOGY | Age: 79
End: 2024-03-06
Payer: MEDICARE

## 2024-03-06 ENCOUNTER — TELEPHONE (OUTPATIENT)
Dept: UROGYNECOLOGY | Age: 79
End: 2024-03-06

## 2024-03-06 VITALS
RESPIRATION RATE: 18 BRPM | SYSTOLIC BLOOD PRESSURE: 135 MMHG | HEART RATE: 90 BPM | TEMPERATURE: 97.6 F | OXYGEN SATURATION: 96 % | DIASTOLIC BLOOD PRESSURE: 61 MMHG

## 2024-03-06 DIAGNOSIS — N39.0 ACUTE UTI: ICD-10-CM

## 2024-03-06 DIAGNOSIS — R39.15 URINARY URGENCY: Primary | ICD-10-CM

## 2024-03-06 DIAGNOSIS — R35.0 URINARY FREQUENCY: ICD-10-CM

## 2024-03-06 LAB
BILIRUBIN, POC: NORMAL
BLOOD URINE, POC: NORMAL
CLARITY, POC: NORMAL
COLOR, POC: YELLOW
GLUCOSE URINE, POC: NORMAL
PH, POC: 6
SPECIFIC GRAVITY, POC: 1.02

## 2024-03-06 PROCEDURE — 1090F PRES/ABSN URINE INCON ASSESS: CPT | Performed by: NURSE PRACTITIONER

## 2024-03-06 PROCEDURE — G8484 FLU IMMUNIZE NO ADMIN: HCPCS | Performed by: NURSE PRACTITIONER

## 2024-03-06 PROCEDURE — 1123F ACP DISCUSS/DSCN MKR DOCD: CPT | Performed by: NURSE PRACTITIONER

## 2024-03-06 PROCEDURE — 1036F TOBACCO NON-USER: CPT | Performed by: NURSE PRACTITIONER

## 2024-03-06 PROCEDURE — G8421 BMI NOT CALCULATED: HCPCS | Performed by: NURSE PRACTITIONER

## 2024-03-06 PROCEDURE — 81002 URINALYSIS NONAUTO W/O SCOPE: CPT | Performed by: OBSTETRICS & GYNECOLOGY

## 2024-03-06 PROCEDURE — G8400 PT W/DXA NO RESULTS DOC: HCPCS | Performed by: NURSE PRACTITIONER

## 2024-03-06 PROCEDURE — G8427 DOCREV CUR MEDS BY ELIG CLIN: HCPCS | Performed by: NURSE PRACTITIONER

## 2024-03-06 PROCEDURE — 51701 INSERT BLADDER CATHETER: CPT | Performed by: OBSTETRICS & GYNECOLOGY

## 2024-03-06 PROCEDURE — 99212 OFFICE O/P EST SF 10 MIN: CPT | Performed by: NURSE PRACTITIONER

## 2024-03-06 RX ORDER — DOXYCYCLINE HYCLATE 100 MG
100 TABLET ORAL 2 TIMES DAILY
Qty: 14 TABLET | Refills: 0 | Status: SHIPPED | OUTPATIENT
Start: 2024-03-06 | End: 2024-03-08

## 2024-03-06 RX ORDER — DENOSUMAB 60 MG/ML
INJECTION SUBCUTANEOUS
COMMUNITY
Start: 2022-07-19

## 2024-03-06 NOTE — TELEPHONE ENCOUNTER
Called patient and informed her that LIANET Lopez has sent Doxycycline BID x 7 days to her preferred pharmacy. Informed patient we will call her with her culture results in a couple of days and discuss if treatment needs to be changed at that time.     She verbalized understanding. Denies any questions or concerns at this time. Patient thankful.

## 2024-03-06 NOTE — TELEPHONE ENCOUNTER
Called patient and informed her Doxycyline is the right medication. She took this for her last UTI on 1/18/2024. Patient verbalized understanding. Denies any further questions or concerns at this time.

## 2024-03-06 NOTE — TELEPHONE ENCOUNTER
pt states that medication is different than what she had taken. She wants to know if she is taking the right medication.

## 2024-03-06 NOTE — PROGRESS NOTES
VITALS: /61   Pulse 90   Temp 97.6 °F (36.4 °C)   Resp 18   SpO2 96%        SUBJECTIVE: Denise Murillo is a 79 y.o. female who complains of urinary frequency, urgency and pelvic pressure x 3 days, without flank pain, fever, chills, or abnormal vaginal discharge or bleeding.     OBJECTIVE: Appears well, in no apparent distress.  Vital signs are normal. The abdomen is soft without tenderness, guarding, mass, rebound or organomegaly. No CVA tenderness or inguinal adenopathy noted. Urine dipstick shows positive for leukocytes and positive for RBCs.      UA:   Straight urinary catheterization performed by sterile procedure for urine specimen per Dr. Valiente.  40 ml post void.  Patient tolerated well. Dr. Valiente made aware.      Results for POC orders placed in visit on 03/06/24   POCT Urinalysis no Micro   Result Value Ref Range    Color, UA yellow     Clarity, UA turbid     Glucose, UA POC neg     Bilirubin, UA neg     Ketones, UA neg     Spec Grav, UA 1.020     Blood, UA POC pos, large     pH, UA 6.0     Protein, UA POC pos +     Urobilinogen, UA 0.2     Leukocytes, UA pos, large     Nitrite, UA neg         PLAN:  Urine sent for culture. Azo recommended for comfort.     Reviewed above.  Reviewed patients history.  Will have patient begin Doxycycline 100 mg po bid for 7 days.  BLANCA Dong - CNP

## 2024-03-08 DIAGNOSIS — N39.0 ACUTE UTI: Primary | ICD-10-CM

## 2024-03-08 LAB
BACTERIA UR CULT: ABNORMAL
ORGANISM: ABNORMAL

## 2024-03-08 RX ORDER — SULFAMETHOXAZOLE AND TRIMETHOPRIM 800; 160 MG/1; MG/1
1 TABLET ORAL 2 TIMES DAILY
Qty: 14 TABLET | Refills: 0 | Status: SHIPPED | OUTPATIENT
Start: 2024-03-08 | End: 2024-03-15

## 2024-03-08 NOTE — RESULT ENCOUNTER NOTE
Advised patient of positive culture and need for change in treatment. She is aware Bactrim BID for 7 days has been sent to her pharmacy. She denies any questions or concerns at this time.

## 2024-03-13 ENCOUNTER — PROCEDURE VISIT (OUTPATIENT)
Dept: UROGYNECOLOGY | Age: 79
End: 2024-03-13
Payer: MEDICARE

## 2024-03-13 VITALS
HEART RATE: 70 BPM | RESPIRATION RATE: 16 BRPM | OXYGEN SATURATION: 96 % | SYSTOLIC BLOOD PRESSURE: 129 MMHG | TEMPERATURE: 98.7 F | DIASTOLIC BLOOD PRESSURE: 72 MMHG

## 2024-03-13 DIAGNOSIS — N89.8 VAGINAL DISCHARGE: ICD-10-CM

## 2024-03-13 DIAGNOSIS — N39.0 RECURRENT UTI: ICD-10-CM

## 2024-03-13 DIAGNOSIS — N81.11 CYSTOCELE, MIDLINE: ICD-10-CM

## 2024-03-13 DIAGNOSIS — R39.15 URINARY URGENCY: Primary | ICD-10-CM

## 2024-03-13 DIAGNOSIS — N81.4 UTERINE PROLAPSE: ICD-10-CM

## 2024-03-13 DIAGNOSIS — R35.0 URINARY FREQUENCY: ICD-10-CM

## 2024-03-13 PROCEDURE — G8427 DOCREV CUR MEDS BY ELIG CLIN: HCPCS | Performed by: OBSTETRICS & GYNECOLOGY

## 2024-03-13 PROCEDURE — 99213 OFFICE O/P EST LOW 20 MIN: CPT | Performed by: OBSTETRICS & GYNECOLOGY

## 2024-03-13 PROCEDURE — 1123F ACP DISCUSS/DSCN MKR DOCD: CPT | Performed by: OBSTETRICS & GYNECOLOGY

## 2024-03-13 PROCEDURE — G8421 BMI NOT CALCULATED: HCPCS | Performed by: OBSTETRICS & GYNECOLOGY

## 2024-03-13 PROCEDURE — 1036F TOBACCO NON-USER: CPT | Performed by: OBSTETRICS & GYNECOLOGY

## 2024-03-13 PROCEDURE — G8400 PT W/DXA NO RESULTS DOC: HCPCS | Performed by: OBSTETRICS & GYNECOLOGY

## 2024-03-13 PROCEDURE — 1090F PRES/ABSN URINE INCON ASSESS: CPT | Performed by: OBSTETRICS & GYNECOLOGY

## 2024-03-13 PROCEDURE — G8484 FLU IMMUNIZE NO ADMIN: HCPCS | Performed by: OBSTETRICS & GYNECOLOGY

## 2024-03-13 NOTE — PROGRESS NOTES
3/13/2024     HPI:     Name: Denise Murillo  YOB: 1945    CC: Denise Murillo is a 79 y.o. female presenting for an evaluation of prolapse and recurrent UTI's.   HPI: How long have you had this problem?  years  Please rate the severity of your problem:   Anything make it better? Patient is taking Bactrim for a UTI at this time. She started it on 3/8/24.    Ob/Gyn History:    OB History    Para Term  AB Living   2 2 2     2   SAB IAB Ectopic Molar Multiple Live Births             2      # Outcome Date GA Lbr Parveen/2nd Weight Sex Delivery Anes PTL Lv   2 Term         CRISTIAN   1 Term         CRISTIAN     Past Medical History:   Past Medical History:   Diagnosis Date    Acid reflux     Arthritis     Cancer (HCC)     Hyperlipidemia     BORDERLINE    Vertigo      Past Surgical History:   Past Surgical History:   Procedure Laterality Date    APPENDECTOMY      BREAST LUMPECTOMY Right     COLONOSCOPY      COLONOSCOPY N/A 2022    COLONOSCOPY POLYPECTOMY SNARE/COLD BIOPSY performed by Alphonso Montgomery MD at [facility] ENDOSCOPY    ENDOSCOPY, COLON, DIAGNOSTIC      FINGER TRIGGER RELEASE Right     2024     Current Medications:  Current Outpatient Medications   Medication Sig Dispense Refill    sulfamethoxazole-trimethoprim (BACTRIM DS;SEPTRA DS) 800-160 MG per tablet Take 1 tablet by mouth 2 times daily for 7 days 14 tablet 0    denosumab (PROLIA) 60 MG/ML SOSY SC injection Prolia( 60MG/ML Subcutaneous  Every six months ) Active -Hx Entry Subcutaneous Every six months for 0      estradiol (ESTRACE VAGINAL) 0.1 MG/GM vaginal cream Apply 0.25g to the vaginal opening every night at bedtime for 2 weeks, then decrease to two times per week thereafter 45 g 1    prednisoLONE acetate (PRED FORTE) 1 % ophthalmic suspension       NONFORMULARY Take 1,000 mg by mouth daily Yescia D'Arco supplement      CRANBERRY PO Take by mouth      BIOTIN PO Take by mouth      pantoprazole sodium (PROTONIX) 40 MG PACK packet Take 1

## 2024-08-20 ENCOUNTER — TELEPHONE (OUTPATIENT)
Dept: ENT CLINIC | Age: 79
End: 2024-08-20

## 2024-08-20 NOTE — TELEPHONE ENCOUNTER
Pt called and had some questions for the allergy nurse. Attempted to call back. LVM for pt to call back.

## 2024-09-10 ENCOUNTER — OFFICE VISIT (OUTPATIENT)
Dept: UROGYNECOLOGY | Age: 79
End: 2024-09-10
Payer: MEDICARE

## 2024-09-10 VITALS
HEART RATE: 80 BPM | OXYGEN SATURATION: 97 % | DIASTOLIC BLOOD PRESSURE: 68 MMHG | TEMPERATURE: 98.8 F | SYSTOLIC BLOOD PRESSURE: 129 MMHG

## 2024-09-10 DIAGNOSIS — N39.0 RECURRENT UTI: Primary | ICD-10-CM

## 2024-09-10 DIAGNOSIS — N95.2 VAGINAL ATROPHY: ICD-10-CM

## 2024-09-10 DIAGNOSIS — N81.4 UTERINE PROLAPSE: ICD-10-CM

## 2024-09-10 DIAGNOSIS — N81.11 CYSTOCELE, MIDLINE: ICD-10-CM

## 2024-09-10 LAB
BILIRUBIN, POC: NORMAL
BLOOD URINE, POC: NORMAL
CLARITY, POC: CLEAR
COLOR, POC: YELLOW
GLUCOSE URINE, POC: NORMAL MG/DL
KETONES, POC: NORMAL MG/DL
LEUKOCYTE EST, POC: NORMAL
NITRITE, POC: NORMAL
PH, POC: 6.5
PROTEIN, POC: NORMAL MG/DL
SPECIFIC GRAVITY, POC: 1.01
UROBILINOGEN, POC: NORMAL MG/DL

## 2024-09-10 PROCEDURE — 1123F ACP DISCUSS/DSCN MKR DOCD: CPT | Performed by: NURSE PRACTITIONER

## 2024-09-10 PROCEDURE — 81002 URINALYSIS NONAUTO W/O SCOPE: CPT | Performed by: NURSE PRACTITIONER

## 2024-09-10 PROCEDURE — 1090F PRES/ABSN URINE INCON ASSESS: CPT | Performed by: NURSE PRACTITIONER

## 2024-09-10 PROCEDURE — G8400 PT W/DXA NO RESULTS DOC: HCPCS | Performed by: NURSE PRACTITIONER

## 2024-09-10 PROCEDURE — G8421 BMI NOT CALCULATED: HCPCS | Performed by: NURSE PRACTITIONER

## 2024-09-10 PROCEDURE — 99213 OFFICE O/P EST LOW 20 MIN: CPT | Performed by: NURSE PRACTITIONER

## 2024-09-10 PROCEDURE — G8427 DOCREV CUR MEDS BY ELIG CLIN: HCPCS | Performed by: NURSE PRACTITIONER

## 2024-09-10 PROCEDURE — 1036F TOBACCO NON-USER: CPT | Performed by: NURSE PRACTITIONER

## 2024-09-10 RX ORDER — TRIMETHOPRIM 100 MG/1
100 TABLET ORAL DAILY
Qty: 90 TABLET | Refills: 0 | Status: SHIPPED | OUTPATIENT
Start: 2024-09-10 | End: 2024-12-09

## 2024-09-11 LAB
BACTERIA UR CULT: ABNORMAL
ORGANISM: ABNORMAL

## 2024-09-12 DIAGNOSIS — N39.0 ACUTE UTI: Primary | ICD-10-CM

## 2024-09-12 RX ORDER — ERYTHROMYCIN 500 MG/1
500 TABLET, COATED ORAL 2 TIMES DAILY
Qty: 14 TABLET | Refills: 0 | Status: SHIPPED | OUTPATIENT
Start: 2024-09-12 | End: 2024-09-19

## 2024-09-21 ENCOUNTER — HOSPITAL ENCOUNTER (OUTPATIENT)
Dept: CT IMAGING | Age: 79
Discharge: HOME | End: 2024-09-21
Payer: MEDICARE

## 2024-09-21 DIAGNOSIS — N39.0 RECURRENT UTI: ICD-10-CM

## 2024-09-21 PROCEDURE — 6360000004 HC RX CONTRAST MEDICATION: Performed by: NURSE PRACTITIONER

## 2024-09-21 PROCEDURE — 74178 CT ABD&PLV WO CNTR FLWD CNTR: CPT | Performed by: NURSE PRACTITIONER

## 2024-09-21 RX ORDER — IOPAMIDOL 755 MG/ML
75 INJECTION, SOLUTION INTRAVASCULAR
Status: COMPLETED | OUTPATIENT
Start: 2024-09-21 | End: 2024-09-21

## 2024-09-21 RX ADMIN — IOPAMIDOL 75 ML: 755 INJECTION, SOLUTION INTRAVENOUS at 08:10

## 2024-10-17 ENCOUNTER — TELEPHONE (OUTPATIENT)
Dept: ORTHOPEDIC SURGERY | Age: 79
End: 2024-10-17

## 2024-10-17 NOTE — TELEPHONE ENCOUNTER
General Question     Subject: XR RESULTS  Patient and /or Facility Request: Denise Murillo   Contact Number: [phone]      PATIENT CALLED WANTING TO CONFIRM IF SHE IS NOT ABLE TO GET HER XR RESULTS IF THE OFFICE WILL HELP OBTAIN THIS INFO    PLEASE CALL PATIENT BACK AT THE ABOVE NUMBER

## 2024-10-17 NOTE — TELEPHONE ENCOUNTER
CALLED PT AND LVM TO RETURN OUR CALL WITH WHERE SHE HAD HER XRAYS DONE TO DETERMINE IF WE CAN GET RESULTS.

## 2024-10-18 ENCOUNTER — TELEPHONE (OUTPATIENT)
Dept: ORTHOPEDIC SURGERY | Age: 79
End: 2024-10-18

## 2024-10-18 NOTE — TELEPHONE ENCOUNTER
General Question     Subject:RETURNED MARJAN'S PHONE CALL  Patient and /or Facility Request: Denise Murillo   Contact Number: [phone]     THE PT HAD TO LEAVE TO GO TO WORK.  SHE'LL BE HOME AROUND NOON.      SHE SAID SHE WENT

## 2024-10-18 NOTE — TELEPHONE ENCOUNTER
Pt called to inform us XR was done at [facility] on [address], and seen by Dr Hemant Glasgow on August 3, 2024.

## 2024-10-18 NOTE — TELEPHONE ENCOUNTER
Returned call to patient she stated that she has not been able to obtain her previous imagine from [facility]     We as well have been unable to obtain xray from previous provider     Patient will need new imagines for visit     She stated that she would like to be seen for Lt thigh , knee calf and foot pain     She stated that she has a provided that does home visit and also a in office provider     She has currently been experiencing serve pain with leg heaviness she is scheduled for 10/22/2024 but she is going to contact her pcp also for guidance for next steps do to recent change in symptom

## 2024-10-18 NOTE — TELEPHONE ENCOUNTER
Patient is calling again to see if Maira can give her a call back regarding getting her Xray from [place]. Please Advise

## 2024-12-26 ENCOUNTER — OFFICE VISIT (OUTPATIENT)
Dept: UROGYNECOLOGY | Age: 79
End: 2024-12-26
Payer: MEDICARE

## 2024-12-26 VITALS
SYSTOLIC BLOOD PRESSURE: 122 MMHG | DIASTOLIC BLOOD PRESSURE: 68 MMHG | OXYGEN SATURATION: 97 % | TEMPERATURE: 97.8 F | RESPIRATION RATE: 16 BRPM | HEART RATE: 86 BPM

## 2024-12-26 DIAGNOSIS — N39.0 RECURRENT UTI: ICD-10-CM

## 2024-12-26 DIAGNOSIS — N95.2 VAGINAL ATROPHY: ICD-10-CM

## 2024-12-26 DIAGNOSIS — N81.4 UTERINE PROLAPSE: ICD-10-CM

## 2024-12-26 DIAGNOSIS — N81.11 CYSTOCELE, MIDLINE: Primary | ICD-10-CM

## 2024-12-26 PROCEDURE — G8421 BMI NOT CALCULATED: HCPCS | Performed by: NURSE PRACTITIONER

## 2024-12-26 PROCEDURE — 1036F TOBACCO NON-USER: CPT | Performed by: NURSE PRACTITIONER

## 2024-12-26 PROCEDURE — 1090F PRES/ABSN URINE INCON ASSESS: CPT | Performed by: NURSE PRACTITIONER

## 2024-12-26 PROCEDURE — 99213 OFFICE O/P EST LOW 20 MIN: CPT | Performed by: NURSE PRACTITIONER

## 2024-12-26 PROCEDURE — 1159F MED LIST DOCD IN RCRD: CPT | Performed by: NURSE PRACTITIONER

## 2024-12-26 PROCEDURE — G8400 PT W/DXA NO RESULTS DOC: HCPCS | Performed by: NURSE PRACTITIONER

## 2024-12-26 PROCEDURE — G8427 DOCREV CUR MEDS BY ELIG CLIN: HCPCS | Performed by: NURSE PRACTITIONER

## 2024-12-26 PROCEDURE — 1160F RVW MEDS BY RX/DR IN RCRD: CPT | Performed by: NURSE PRACTITIONER

## 2024-12-26 PROCEDURE — G8484 FLU IMMUNIZE NO ADMIN: HCPCS | Performed by: NURSE PRACTITIONER

## 2024-12-26 PROCEDURE — 1123F ACP DISCUSS/DSCN MKR DOCD: CPT | Performed by: NURSE PRACTITIONER

## 2024-12-26 NOTE — PROGRESS NOTES
Date: 2024     HPI:     Name: Denise Murillo  YOB: 1945    CC: Denise Murillo returns for a pessary check today.    HPI:  She reports no bleeding, has discharge, no pain, no discomfot. She is not removing the device herself at home.     Do you have vaginal discharge?: Yes  How long have you had this problem?:   years  Please rate the severity of your problem:   Anything make it better? Trimpex and pessary    Also presents for f/u on Recurrent UTI  She has about 1 week left of Trimpex  Continues to use estrogen cream  Continues cranberry and D-mannose    Do UTI's since last visit  Asymptomatic today       Ob/Gyn History:    OB History    Para Term  AB Living   2 2 2     2   SAB IAB Ectopic Molar Multiple Live Births             2      # Outcome Date GA Lbr Parveen/2nd Weight Sex Type Anes PTL Lv   2 Term         CRISTIAN   1 Term         CRISTIAN      Past Medical History:   Past Medical History:   Diagnosis Date    Acid reflux     Arthritis     Cancer (HCC)     Hyperlipidemia     BORDERLINE    Vertigo      Past Surgical History:   Past Surgical History:   Procedure Laterality Date    APPENDECTOMY      BREAST LUMPECTOMY Right     COLONOSCOPY      COLONOSCOPY N/A 2022    COLONOSCOPY POLYPECTOMY SNARE/COLD BIOPSY performed by Alphonso Montgomery MD at [facility] ENDOSCOPY    ENDOSCOPY, COLON, DIAGNOSTIC      FINGER TRIGGER RELEASE Right     2024     Current Medications:  Current Outpatient Medications   Medication Sig Dispense Refill    denosumab (PROLIA) 60 MG/ML SOSY SC injection Prolia( 60MG/ML Subcutaneous  Every six months ) Active -Hx Entry Subcutaneous Every six months for 0      estradiol (ESTRACE VAGINAL) 0.1 MG/GM vaginal cream Apply 0.25g to the vaginal opening every night at bedtime for 2 weeks, then decrease to two times per week thereafter 45 g 1    prednisoLONE acetate (PRED FORTE) 1 % ophthalmic suspension       NONFORMULARY Take 1,000 mg by mouth daily Yesica D'Arco supplement

## 2025-01-02 ENCOUNTER — TELEPHONE (OUTPATIENT)
Dept: UROGYNECOLOGY | Age: 80
End: 2025-01-02

## 2025-01-02 DIAGNOSIS — R39.15 URINARY URGENCY: Primary | ICD-10-CM

## 2025-01-02 DIAGNOSIS — R35.0 URINARY FREQUENCY: ICD-10-CM

## 2025-01-02 NOTE — TELEPHONE ENCOUNTER
Patient is unable to come to [place] tomorrow for a UTI Check. Outpatient Urine Culture orders placed. Patient verbalizes understanding of all of the above, and has no further questions or concerns at this time. Encouraged to call back the office should any questions/concerns arise. 1/2/2025 at 3:56 PM.

## 2025-01-03 DIAGNOSIS — R39.15 URINARY URGENCY: ICD-10-CM

## 2025-01-03 DIAGNOSIS — R35.0 URINARY FREQUENCY: ICD-10-CM

## 2025-01-06 DIAGNOSIS — N39.0 RECURRENT UTI: Primary | ICD-10-CM

## 2025-01-06 LAB
BACTERIA UR CULT: ABNORMAL
ORGANISM: ABNORMAL

## 2025-01-06 RX ORDER — TRIMETHOPRIM 100 MG/1
100 TABLET ORAL DAILY
Qty: 90 TABLET | Refills: 0 | Status: SHIPPED | OUTPATIENT
Start: 2025-01-06 | End: 2025-04-06

## 2025-01-07 ENCOUNTER — TELEPHONE (OUTPATIENT)
Dept: UROGYNECOLOGY | Age: 80
End: 2025-01-07

## 2025-01-07 NOTE — TELEPHONE ENCOUNTER
Spoke with patient as she called in requesting instructions. Patient advised of positive culture and need for treatment. Jessica Mirza NP sent in Macrobid at and patient should complete full course. She also sent in Trimpex for daily prophylaxis to her preferred pharmacy to begin after treatment. She was advised to drop off urine specimen first, and then begin daily trimpex. Patient verbalized understanding to all instructions and has no further needs at this time. Electronically signed by Chuy Mccarthy RN on 1/7/2025 at 3:57 PM

## 2025-01-09 ENCOUNTER — TELEPHONE (OUTPATIENT)
Dept: UROGYNECOLOGY | Age: 80
End: 2025-01-09

## 2025-01-09 NOTE — TELEPHONE ENCOUNTER
Called pt who states symptoms from UTI have dissipated.Now complaining of fever and minor chills.  Pt given instructions from Jessica MARTINI if fever continues to rise to go to ER.  Jessica MARTINI on phone with pt.   Electronically signed by Kayla Quijano RN on 1/9/2025 at 2:30 PM

## 2025-01-10 ENCOUNTER — TELEPHONE (OUTPATIENT)
Dept: UROGYNECOLOGY | Age: 80
End: 2025-01-10

## 2025-01-10 NOTE — TELEPHONE ENCOUNTER
Phoned patient to follow up.  She reports she is afebrile today however has some swollen glands this morning. Discussed with her information provided by discussion with ID that MRSA is not a typical urinary pathogen and concern for bacteremia should be considered.    As a patient has had fever and now reports swollen lymph nodes I recommend referral to the ER for workup. Patient agrees and will head to ER now.

## 2025-01-13 ENCOUNTER — TELEPHONE (OUTPATIENT)
Dept: UROGYNECOLOGY | Age: 80
End: 2025-01-13

## 2025-01-13 NOTE — TELEPHONE ENCOUNTER
Called pt per Jessica MARTINI to check on her after calling on 1/10/25.  Complained at time of having chills and fever.  Today 1/13/25 pt reports feeling much better.  Denies chills/fever.  Pt states she thinks she had a minor cold.  Jessica MARTINI notified of above.  Electronically signed by Kayla Quijano RN on 1/13/2025 at 11:35 AM

## 2025-01-15 DIAGNOSIS — R35.0 URINARY FREQUENCY: ICD-10-CM

## 2025-01-15 DIAGNOSIS — R39.15 URINARY URGENCY: Primary | ICD-10-CM

## 2025-01-15 DIAGNOSIS — R39.15 URINARY URGENCY: ICD-10-CM

## 2025-01-16 LAB
BACTERIA UR CULT: ABNORMAL
ORGANISM: ABNORMAL

## 2025-01-17 NOTE — RESULT ENCOUNTER NOTE
Patient states that she is feeling just fine. Informed her that she has cleared the previous MRSA infection and that she should continue to take the 90 day supply of daily Trimethoprim.

## 2025-02-13 ENCOUNTER — HOSPITAL ENCOUNTER (OUTPATIENT)
Dept: WOMENS IMAGING | Age: 80
Discharge: HOME | End: 2025-02-13
Payer: MEDICARE

## 2025-02-13 VITALS — HEIGHT: 61 IN | WEIGHT: 134 LBS | BODY MASS INDEX: 25.3 KG/M2

## 2025-02-13 DIAGNOSIS — Z12.31 SCREENING MAMMOGRAM FOR BREAST CANCER: ICD-10-CM

## 2025-02-13 PROCEDURE — 77063 BREAST TOMOSYNTHESIS BI: CPT

## 2025-04-25 ENCOUNTER — TELEPHONE (OUTPATIENT)
Dept: UROGYNECOLOGY | Age: 80
End: 2025-04-25

## 2025-04-25 RX ORDER — SULFAMETHOXAZOLE AND TRIMETHOPRIM 800; 160 MG/1; MG/1
1 TABLET ORAL 2 TIMES DAILY
Qty: 10 TABLET | Refills: 0 | Status: SHIPPED | OUTPATIENT
Start: 2025-04-25 | End: 2025-04-30

## 2025-04-25 NOTE — TELEPHONE ENCOUNTER
Pt had a posiive UTI culture, was prescribed trimethoprim 100mg, took a 9 day course and finished on 22nd, pts symptoms have cleared up. She was wondering if she needs another appt to do another culture.     Pt also discussed with CNP needing another Rx of the same antibiotic to self start if another infection pops up

## 2025-04-25 NOTE — TELEPHONE ENCOUNTER
Patient completed 90 day supply of daily trimpex. Denies current UTI symptoms, encouraged her that we do not need a FELICIA at this time.   She states that she is leaving on vacation to [place] in 2 weeks for 5 days and is requesting to have abx on hand incase she develops a UTI.     Per Jessica Mirza, Patient can have bactrim BID x5 days as a self-start for her vacation. 4/25/2025 at 12:23 PM     Patient verbalizes understanding of all of the above, and has no further questions or concerns at this time. Encouraged to call back the office should any questions/concerns arise. 4/25/2025 at 12:12 PM.

## 2025-04-28 ENCOUNTER — OFFICE VISIT (OUTPATIENT)
Dept: UROGYNECOLOGY | Age: 80
End: 2025-04-28
Payer: MEDICARE

## 2025-04-28 VITALS
TEMPERATURE: 98.2 F | RESPIRATION RATE: 18 BRPM | HEART RATE: 78 BPM | DIASTOLIC BLOOD PRESSURE: 66 MMHG | OXYGEN SATURATION: 98 % | SYSTOLIC BLOOD PRESSURE: 150 MMHG

## 2025-04-28 DIAGNOSIS — N39.0 RECURRENT UTI: Primary | ICD-10-CM

## 2025-04-28 DIAGNOSIS — N39.0 ACUTE UTI: ICD-10-CM

## 2025-04-28 DIAGNOSIS — R35.0 URINARY FREQUENCY: ICD-10-CM

## 2025-04-28 LAB
BILIRUBIN, POC: NORMAL
BLOOD URINE, POC: NORMAL
CLARITY, POC: NORMAL
COLOR, POC: YELLOW
GLUCOSE URINE, POC: NORMAL MG/DL
KETONES, POC: NORMAL MG/DL
LEUKOCYTE EST, POC: NORMAL
NITRITE, POC: NORMAL
PH, POC: 6
PROTEIN, POC: NORMAL MG/DL
SPECIFIC GRAVITY, POC: 1.01
UROBILINOGEN, POC: NORMAL MG/DL

## 2025-04-28 PROCEDURE — 1090F PRES/ABSN URINE INCON ASSESS: CPT | Performed by: NURSE PRACTITIONER

## 2025-04-28 PROCEDURE — G8419 CALC BMI OUT NRM PARAM NOF/U: HCPCS | Performed by: NURSE PRACTITIONER

## 2025-04-28 PROCEDURE — 51701 INSERT BLADDER CATHETER: CPT | Performed by: NURSE PRACTITIONER

## 2025-04-28 PROCEDURE — 1123F ACP DISCUSS/DSCN MKR DOCD: CPT | Performed by: NURSE PRACTITIONER

## 2025-04-28 PROCEDURE — G8427 DOCREV CUR MEDS BY ELIG CLIN: HCPCS | Performed by: NURSE PRACTITIONER

## 2025-04-28 PROCEDURE — 81002 URINALYSIS NONAUTO W/O SCOPE: CPT | Performed by: NURSE PRACTITIONER

## 2025-04-28 PROCEDURE — 1159F MED LIST DOCD IN RCRD: CPT | Performed by: NURSE PRACTITIONER

## 2025-04-28 PROCEDURE — 1160F RVW MEDS BY RX/DR IN RCRD: CPT | Performed by: NURSE PRACTITIONER

## 2025-04-28 PROCEDURE — 1036F TOBACCO NON-USER: CPT | Performed by: NURSE PRACTITIONER

## 2025-04-28 PROCEDURE — G8400 PT W/DXA NO RESULTS DOC: HCPCS | Performed by: NURSE PRACTITIONER

## 2025-04-28 PROCEDURE — 99213 OFFICE O/P EST LOW 20 MIN: CPT | Performed by: NURSE PRACTITIONER

## 2025-04-28 RX ORDER — CEFDINIR 300 MG/1
300 CAPSULE ORAL 2 TIMES DAILY
Qty: 14 CAPSULE | Refills: 0 | Status: SHIPPED | OUTPATIENT
Start: 2025-04-28 | End: 2025-05-05

## 2025-04-28 NOTE — PATIENT INSTRUCTIONS
Take Cefdinir 100 mg twice a day for next 7 days    After culture returns I will send a new prescription for self start treatment in the future. You will use this when you begin symptoms of UTI at home.  The bottle will have two full treatment courses in it.   If you take 7 days worth and are not better, or symptoms quickly return (Within 2 weeks) you need a culture!    Otherwise go on with your life and use your self start when needed    :)

## 2025-04-28 NOTE — PROGRESS NOTES
Tobacco Use    Smoking status: Former     Current packs/day: 0.00     Types: Cigarettes     Start date:      Quit date:      Years since quittin.3    Smokeless tobacco: Never   Vaping Use    Vaping status: Never Used   Substance and Sexual Activity    Alcohol use: Yes     Comment: occasionally    Drug use: No    Sexual activity: Not Currently   Other Topics Concern    Not on file   Social History Narrative    Not on file     Social Drivers of Health     Financial Resource Strain: Not on file   Food Insecurity: Unknown (2024)    Received from [facility] [employer] [facility] [employer], [facility] and [facility]    Food Insecurities     Worried about running out of food: Not on file     Food Bought: Not on file   Transportation Needs: Unknown (2024)    Received from [facility] and [facility] [email or web address] [facility], [facility] and [facility]    Transportation     Worried about transportation: Not on file   Physical Activity: Not on file   Stress: Not on file   Social Connections: Not on file   Intimate Partner Violence: Unknown (2024)    Received from [facility] [employer] [facility] [email or web address] [facility], [facility] and [facility]    Interpersonal Safety     Feel physically or emotionally unsafe where currently live: Not on file     Harm by anyone: Not on file     Emotionally Harmed: Not on file   Housing Stability: Unknown (2024)    Received from [facility] [employer] [facility] [email or web address] [facility], [facility]    Housing/Utilities     Worried about losing home: Not on file     Stayed outside house: Not on file     Unable to get utilities: Not on file     Family History:   Family History   Problem Relation Age of Onset    No Known Problems Mother     No Known Problems Father     No Known Problems Sister     No Known Problems Sister     Colon Cancer Brother     Cancer Brother     Cancer Brother          Objective:     Vitals  Vitals:    25

## 2025-04-29 ENCOUNTER — RESULTS FOLLOW-UP (OUTPATIENT)
Dept: UROGYNECOLOGY | Age: 80
End: 2025-04-29

## 2025-04-30 LAB
BACTERIA UR CULT: ABNORMAL
ORGANISM: ABNORMAL

## 2025-04-30 RX ORDER — SULFAMETHOXAZOLE AND TRIMETHOPRIM 800; 160 MG/1; MG/1
TABLET ORAL
Qty: 10 TABLET | Refills: 0 | Status: SHIPPED | OUTPATIENT
Start: 2025-04-30

## 2025-04-30 NOTE — RESULT ENCOUNTER NOTE
Informed patient of positive results and need to switch antibiotic to Bactrim, as well as need to follow up for FELICIA at completion of antibiotics. Informed her that she can take D mannose. Patient verbalizes understanding of all of the above, and has no further questions or concerns at this time. Encouraged to call back the office should any questions/concerns arise. 4/30/2025 at 3:48 PM.

## 2025-05-06 ENCOUNTER — TELEPHONE (OUTPATIENT)
Dept: UROGYNECOLOGY | Age: 80
End: 2025-05-06

## 2025-05-06 ENCOUNTER — OFFICE VISIT (OUTPATIENT)
Dept: UROGYNECOLOGY | Age: 80
End: 2025-05-06
Payer: MEDICARE

## 2025-05-06 VITALS
RESPIRATION RATE: 18 BRPM | TEMPERATURE: 98.7 F | HEART RATE: 89 BPM | OXYGEN SATURATION: 96 % | DIASTOLIC BLOOD PRESSURE: 76 MMHG | SYSTOLIC BLOOD PRESSURE: 144 MMHG

## 2025-05-06 DIAGNOSIS — R39.15 URINARY URGENCY: ICD-10-CM

## 2025-05-06 DIAGNOSIS — R35.0 URINARY FREQUENCY: ICD-10-CM

## 2025-05-06 DIAGNOSIS — N39.0 RECURRENT UTI: Primary | ICD-10-CM

## 2025-05-06 PROCEDURE — 99212 OFFICE O/P EST SF 10 MIN: CPT | Performed by: NURSE PRACTITIONER

## 2025-05-06 PROCEDURE — 1160F RVW MEDS BY RX/DR IN RCRD: CPT | Performed by: NURSE PRACTITIONER

## 2025-05-06 PROCEDURE — G8419 CALC BMI OUT NRM PARAM NOF/U: HCPCS | Performed by: NURSE PRACTITIONER

## 2025-05-06 PROCEDURE — 1123F ACP DISCUSS/DSCN MKR DOCD: CPT | Performed by: NURSE PRACTITIONER

## 2025-05-06 PROCEDURE — G8427 DOCREV CUR MEDS BY ELIG CLIN: HCPCS | Performed by: NURSE PRACTITIONER

## 2025-05-06 PROCEDURE — 1159F MED LIST DOCD IN RCRD: CPT | Performed by: NURSE PRACTITIONER

## 2025-05-06 PROCEDURE — G8400 PT W/DXA NO RESULTS DOC: HCPCS | Performed by: NURSE PRACTITIONER

## 2025-05-06 PROCEDURE — 81002 URINALYSIS NONAUTO W/O SCOPE: CPT | Performed by: NURSE PRACTITIONER

## 2025-05-06 PROCEDURE — 1036F TOBACCO NON-USER: CPT | Performed by: NURSE PRACTITIONER

## 2025-05-06 PROCEDURE — 1090F PRES/ABSN URINE INCON ASSESS: CPT | Performed by: NURSE PRACTITIONER

## 2025-05-06 RX ORDER — SULFAMETHOXAZOLE AND TRIMETHOPRIM 800; 160 MG/1; MG/1
1 TABLET ORAL 2 TIMES DAILY
Qty: 6 TABLET | Refills: 0 | Status: SHIPPED | OUTPATIENT
Start: 2025-05-06 | End: 2025-05-09

## 2025-05-06 NOTE — PROGRESS NOTES
2025       HPI:     Name: Denise Murillo  YOB: 1945    CC: Patient is a 80 y.o. presenting for evaluation of FELICIA. Patient denies UTI symptoms today.       HPI:   Recent positive culture on 2025 - treated with Bactrim BID x 5 days.       Component  Ref Range & Units (hover)    Organism Staph aureus MRSA Abnormal    Urine Culture, Routine  Abnormal   75,000 CFU/ml  CONTACT PRECAUTIONS INDICATED   R- oxacillin     How long have you had this problem?    Please rate the severity of your problem: mild  Anything make it better?     Ob/Gyn History:    OB History    Para Term  AB Living   2 2 2   2   SAB IAB Ectopic Molar Multiple Live Births        2      # Outcome Date GA Lbr Parveen/2nd Weight Sex Type Anes PTL Lv   2 Term         CRISTIAN   1 Term         CRISTIAN     Past Medical History:   Past Medical History:   Diagnosis Date    Acid reflux     Arthritis     Cancer (HCC)     Hyperlipidemia     BORDERLINE    Vertigo      Past Surgical History:   Past Surgical History:   Procedure Laterality Date    APPENDECTOMY      BREAST LUMPECTOMY Right     COLONOSCOPY      COLONOSCOPY N/A 2022    COLONOSCOPY POLYPECTOMY SNARE/COLD BIOPSY performed by Alphonso Montgomery MD at [facility] ENDOSCOPY    ENDOSCOPY, COLON, DIAGNOSTIC      FINGER TRIGGER RELEASE Right     2024     Allergies:   Allergies   Allergen Reactions    Clarithromycin Diarrhea    Clindamycin/Lincomycin Diarrhea     Current Medications:  Current Outpatient Medications   Medication Sig Dispense Refill    sulfamethoxazole-trimethoprim (BACTRIM DS;SEPTRA DS) 800-160 MG per tablet Take 1 tablet by mouth 2 times daily for 3 days 6 tablet 0    sulfamethoxazole-trimethoprim (BACTRIM DS;SEPTRA DS) 800-160 MG per tablet Take one tablet twice daily for 5 days with onset of symptom 10 tablet 0    denosumab (PROLIA) 60 MG/ML SOSY SC injection Prolia( 60MG/ML Subcutaneous  Every six months ) Active -Hx Entry Subcutaneous Every six months for 0

## 2025-05-07 LAB — BACTERIA UR CULT: NORMAL

## 2025-05-08 ENCOUNTER — RESULTS FOLLOW-UP (OUTPATIENT)
Dept: UROGYNECOLOGY | Age: 80
End: 2025-05-08

## 2025-05-08 NOTE — RESULT ENCOUNTER NOTE
Informed patient of normal urine culture results. Patient verbalizes understanding of all of the above, and has no further questions or concerns at this time. Encouraged to call back the office should any questions/concerns arise. 5/8/2025 at 11:06 AM.

## 2025-05-15 ENCOUNTER — TELEPHONE (OUTPATIENT)
Dept: UROGYNECOLOGY | Age: 80
End: 2025-05-15

## 2025-05-15 NOTE — TELEPHONE ENCOUNTER
Spoke with patient - confirmed patient has 6 tablets of Bactrim for her trip if she starts to develop UTI symptoms, she will use this as self start if needed. Patient has no UTI symptoms at this time. No need for refills at this time, patient to call office if she has to use this antibiotic on her trip.

## 2025-05-15 NOTE — TELEPHONE ENCOUNTER
Pt states that she had self-start Bactrim called in for if symptoms start while she is out of town, pt did not receive this Rx, but she has another Rx leftover for three days worth of Bactrim. Pt is requesting that we try to re-sent this Rx.     She is leaving for work in the next 30 mins and is okay with RN leaving a message as she is not allowed to have her phone with her.

## 2025-06-26 ENCOUNTER — OFFICE VISIT (OUTPATIENT)
Dept: UROGYNECOLOGY | Age: 80
End: 2025-06-26
Payer: MEDICARE

## 2025-06-26 VITALS
OXYGEN SATURATION: 97 % | DIASTOLIC BLOOD PRESSURE: 67 MMHG | RESPIRATION RATE: 16 BRPM | HEART RATE: 81 BPM | SYSTOLIC BLOOD PRESSURE: 137 MMHG | TEMPERATURE: 98.4 F

## 2025-06-26 DIAGNOSIS — N95.2 VAGINAL ATROPHY: ICD-10-CM

## 2025-06-26 DIAGNOSIS — N39.0 RECURRENT UTI: Primary | ICD-10-CM

## 2025-06-26 DIAGNOSIS — N81.4 UTERINE PROLAPSE: ICD-10-CM

## 2025-06-26 DIAGNOSIS — N81.11 CYSTOCELE, MIDLINE: ICD-10-CM

## 2025-06-26 PROCEDURE — 1090F PRES/ABSN URINE INCON ASSESS: CPT | Performed by: NURSE PRACTITIONER

## 2025-06-26 PROCEDURE — G8427 DOCREV CUR MEDS BY ELIG CLIN: HCPCS | Performed by: NURSE PRACTITIONER

## 2025-06-26 PROCEDURE — G8400 PT W/DXA NO RESULTS DOC: HCPCS | Performed by: NURSE PRACTITIONER

## 2025-06-26 PROCEDURE — 1123F ACP DISCUSS/DSCN MKR DOCD: CPT | Performed by: NURSE PRACTITIONER

## 2025-06-26 PROCEDURE — 1159F MED LIST DOCD IN RCRD: CPT | Performed by: NURSE PRACTITIONER

## 2025-06-26 PROCEDURE — 1160F RVW MEDS BY RX/DR IN RCRD: CPT | Performed by: NURSE PRACTITIONER

## 2025-06-26 PROCEDURE — 99212 OFFICE O/P EST SF 10 MIN: CPT | Performed by: NURSE PRACTITIONER

## 2025-06-26 PROCEDURE — G8419 CALC BMI OUT NRM PARAM NOF/U: HCPCS | Performed by: NURSE PRACTITIONER

## 2025-06-26 PROCEDURE — 1036F TOBACCO NON-USER: CPT | Performed by: NURSE PRACTITIONER

## 2025-06-26 NOTE — PROGRESS NOTES
Partner Violence: Unknown (1/18/2024)    Received from [facility], [facility]    Interpersonal Safety     Feel physically or emotionally unsafe where currently live: Not on file     Harm by anyone: Not on file     Emotionally Harmed: Not on file   Housing Stability: Unknown (1/18/2024)    Received from [facility], [facility]    Housing/Utilities     Worried about losing home: Not on file     Stayed outside house: Not on file     Unable to get utilities: Not on file     Family History:   Family History   Problem Relation Age of Onset    No Known Problems Mother     No Known Problems Father     No Known Problems Sister     No Known Problems Sister     Colon Cancer Brother     Cancer Brother     Cancer Brother          Objective:     Vital Signs  Vitals:    06/26/25 0828   BP: 137/67   Pulse: 81   Resp: 16   Temp: 98.4 °F (36.9 °C)   SpO2: 97%     Physical Exam  External genitalia: normal, no lesions  Vulva: no lesions  Urethra: no caruncle    The ring with support pessary was removed and cleaned. The entire length of the vagina including the vaginal walls were irrigated with H2O2 coated swabs.  Patient tolerated procedure well.  The pessary was replaced.     No results found for this visit on 06/26/25.    Assessment/Plan:     Denise Murilol is a 80 y.o. female with   1. Recurrent UTI    2. Cystocele, midline    3. Uterine prolapse    4. Vaginal atrophy      -Recurrent UTI:  Stable  She has self start Bactrim and will continue estrogen, d-mannose and cranberry  She is aware of need to come in if s/s of UTI not resolved by self start      Prolapse: Pessary management  The patient is to follow up in 6 months for evaluation. She was counseled on symptoms associated with ulceration or malposition of the device. She was asked to call or return sooner for abnormal discharge, bleeding, spotting, or other

## 2025-08-05 RX ORDER — SULFAMETHOXAZOLE AND TRIMETHOPRIM 800; 160 MG/1; MG/1
TABLET ORAL
Qty: 20 TABLET | Refills: 0 | Status: SHIPPED | OUTPATIENT
Start: 2025-08-05

## (undated) DEVICE — CONTAINER SPEC 480ML CLR POLYSTYR 10% NEUT BUFF FRMLN ZN

## (undated) DEVICE — SNARES COLD OVAL 10MM THIN

## (undated) DEVICE — TRAP POLYP ETRAP

## (undated) DEVICE — ENDOSCOPY KIT: Brand: MEDLINE INDUSTRIES, INC.